# Patient Record
Sex: FEMALE | Race: BLACK OR AFRICAN AMERICAN | NOT HISPANIC OR LATINO | Employment: UNEMPLOYED | URBAN - METROPOLITAN AREA
[De-identification: names, ages, dates, MRNs, and addresses within clinical notes are randomized per-mention and may not be internally consistent; named-entity substitution may affect disease eponyms.]

---

## 2019-03-18 ENCOUNTER — HOSPITAL ENCOUNTER (EMERGENCY)
Facility: HOSPITAL | Age: 24
Discharge: HOME | End: 2019-03-18
Attending: EMERGENCY MEDICINE | Admitting: EMERGENCY MEDICINE
Payer: COMMERCIAL

## 2019-03-18 VITALS
WEIGHT: 225.53 LBS | RESPIRATION RATE: 16 BRPM | OXYGEN SATURATION: 100 % | SYSTOLIC BLOOD PRESSURE: 121 MMHG | TEMPERATURE: 98 F | DIASTOLIC BLOOD PRESSURE: 61 MMHG | HEART RATE: 90 BPM

## 2019-03-18 DIAGNOSIS — Z32.01 POSITIVE PREGNANCY TEST: ICD-10-CM

## 2019-03-18 DIAGNOSIS — B37.3 VAGINAL CANDIDIASIS: Primary | ICD-10-CM

## 2019-03-18 LAB
BILIRUB UR QL STRIP: NEGATIVE
CLARITY UR: ABNORMAL
COLOR UR: YELLOW
EXT PREG TEST URINE: POSITIVE
GLUCOSE UR STRIP-MCNC: NEGATIVE MG/DL
HGB UR QL STRIP.AUTO: NEGATIVE
KETONES UR STRIP-MCNC: NEGATIVE MG/DL
LEUKOCYTE ESTERASE UR QL STRIP: NEGATIVE
NITRITE UR QL STRIP: NEGATIVE
PH UR STRIP.AUTO: 7 [PH]
PROT UR STRIP-MCNC: NEGATIVE MG/DL
SP GR UR STRIP.AUTO: 1.01 (ref 1–1.04)
UROBILINOGEN UA: NEGATIVE MG/DL

## 2019-03-18 PROCEDURE — 81025 URINE PREGNANCY TEST: CPT | Performed by: PHYSICIAN ASSISTANT

## 2019-03-18 PROCEDURE — 81003 URINALYSIS AUTO W/O SCOPE: CPT | Performed by: PHYSICIAN ASSISTANT

## 2019-03-18 PROCEDURE — 99283 EMERGENCY DEPT VISIT LOW MDM: CPT

## 2019-03-18 RX ORDER — CLOTRIMAZOLE 1 %
1 CREAM WITH APPLICATOR VAGINAL
Qty: 45 G | Refills: 0 | Status: SHIPPED | OUTPATIENT
Start: 2019-03-18 | End: 2019-03-25

## 2019-03-18 NOTE — ED PROVIDER NOTES
History  Chief Complaint   Patient presents with    Vaginal Discharge     I have recurrentant yeast infections; this started last week  27-year-old female presenting for evaluation of vaginal discharge  Patient states that she gets yeast infections often this feels the same  She reports last time she was diagnosed and treated with yeast infection was in January 2019  Patient states that she has had symptoms occurring most recently for the past week  She reports vaginal itching along with white thick discharge  She reports the discharge is not malodorous  She denies any dysuria hematuria or vaginal bleeding  She is not concerned for STDs  No abdominal pain nausea vomiting diarrhea fevers chills or sweats  None       History reviewed  No pertinent past medical history  History reviewed  No pertinent surgical history  History reviewed  No pertinent family history  I have reviewed and agree with the history as documented  Social History     Tobacco Use    Smoking status: Never Smoker    Smokeless tobacco: Never Used   Substance Use Topics    Alcohol use: Yes    Drug use: Never        Review of Systems   All other systems reviewed and are negative  Physical Exam  Physical Exam   Constitutional: She is oriented to person, place, and time  She appears well-developed and well-nourished  No distress  HENT:   Head: Normocephalic and atraumatic  Eyes: Conjunctivae are normal    EOM grossly intact   Neck: Normal range of motion  Neck supple  No JVD present  Cardiovascular: Normal rate  Pulmonary/Chest: Effort normal    Abdominal: Soft  Genitourinary:   Genitourinary Comments: deferred   Musculoskeletal:   FROM, steady gait, cap refill brisk, strength and sensation grossly intact throughout   Neurological: She is alert and oriented to person, place, and time  Skin: Skin is warm and dry  Capillary refill takes less than 2 seconds     Psychiatric: She has a normal mood and affect  Her behavior is normal    Nursing note and vitals reviewed        Vital Signs  ED Triage Vitals [03/18/19 1443]   Temperature Pulse Respirations Blood Pressure SpO2   98 °F (36 7 °C) 90 16 121/61 100 %      Temp Source Heart Rate Source Patient Position - Orthostatic VS BP Location FiO2 (%)   Tymp Core Monitor Sitting Left arm --      Pain Score       No Pain           Vitals:    03/18/19 1443   BP: 121/61   Pulse: 90   Patient Position - Orthostatic VS: Sitting       qSOFA     Row Name 03/18/19 1443                Altered mental status GCS < 15  --        Respiratory Rate > / =71  0        Systolic BP < / =307  0        Q Sofa Score  0              Visual Acuity      ED Medications  Medications - No data to display    Diagnostic Studies  Results Reviewed     Procedure Component Value Units Date/Time    UA w Reflex to Microscopic w Reflex to Culture [[id number]]  (Abnormal) Collected:  03/18/19 1502    Lab Status:  Final result Specimen:  Urine, Clean Catch Updated:  03/18/19 1513     Color, UA Yellow     Clarity, UA Cloudy     Specific Gravity, UA 1 015     pH, UA 7 0     Leukocytes, UA Negative     Nitrite, UA Negative     Protein, UA Negative mg/dl      Glucose, UA Negative mg/dl      Ketones, UA Negative mg/dl      Bilirubin, UA Negative     Blood, UA Negative     UROBILINOGEN UA Negative mg/dL     POCT pregnancy, urine [[id number]]  (Abnormal) Resulted:  03/18/19 1506    Lab Status:  Final result Updated:  03/18/19 1507     EXT PREG TEST UR (Ref: Negative) positive                 No orders to display              Procedures  Procedures       Phone Contacts  ED Phone Contact    ED Course  ED Course as of Mar 18 1527   Mon Mar 18, 2019   1510 Patient was advised of positive pregnancy test, this was new information as she reports LMP was 2/26, will await UA and then dispo                                  MDM  Number of Diagnoses or Management Options  Positive pregnancy test:   Vaginal candidiasis: Diagnosis management comments: 21-year-old female presenting for evaluation of vaginal discharge, patient did have a positive pregnancy test here which was not known to her prior to this ED visit, patient did not have any UTI on urinalysis, advised her that Diflucan p o  Should not be taken during pregnancy especially in the 1st trimester, will send home with prescription for topical vaginal cream, advised to f/u with obgyn, f/u with pcp as needed     All labs discussed with patient, strict return to ED precautions discussed  Pt verbalizes understanding and agrees with plan  Pt is stable for discharge    Portions of the record may have been created with voice recognition software  Occasional wrong word or "sound a like" substitutions may have occurred due to the inherent limitations of voice recognition software  Read the chart carefully and recognize, using context, where substitutions have occurred  Disposition  Final diagnoses:   Vaginal candidiasis   Positive pregnancy test     Time reflects when diagnosis was documented in both MDM as applicable and the Disposition within this note     Time User Action Codes Description Comment    3/18/2019  3:18 PM Selma Greene Add [B37 3] Vaginal candidiasis     3/18/2019  3:18 PM Selma Greene Add [Z32 01] Positive pregnancy test       ED Disposition     ED Disposition Condition Date/Time Comment    Discharge Stable Mon Mar 18, 2019  3:18 PM Rebecca Vilchis discharge to home/self care              Follow-up Information     Follow up With Specialties Details Why [address] [facility] Call in 1 day  [address]  [phone]      [facility] [place]  Call in 1 day  [facility], Nya Barrientos [facility]0 [phone]          Discharge Medication List as of 3/18/2019  3:19 PM      START taking these medications    Details   clotrimazole (GYNE-LOTRIMIN) 1 % vaginal cream Insert 1 applicator into the vagina daily at bedtime for 7 days, Starting Mon 3/18/2019, Until Mon 3/25/2019, Print           No discharge procedures on file      ED Provider  Electronically Signed by           Aditya Medrano PA-C  03/18/19 1527

## 2019-04-10 ENCOUNTER — TELEPHONE (OUTPATIENT)
Dept: OBGYN CLINIC | Facility: CLINIC | Age: 24
End: 2019-04-10

## 2019-04-16 ENCOUNTER — HOSPITAL ENCOUNTER (EMERGENCY)
Facility: HOSPITAL | Age: 24
Discharge: HOME | End: 2019-04-16
Attending: EMERGENCY MEDICINE | Admitting: EMERGENCY MEDICINE
Payer: COMMERCIAL

## 2019-04-16 VITALS
WEIGHT: 209 LBS | OXYGEN SATURATION: 100 % | HEART RATE: 100 BPM | SYSTOLIC BLOOD PRESSURE: 150 MMHG | TEMPERATURE: 98.2 F | DIASTOLIC BLOOD PRESSURE: 86 MMHG | RESPIRATION RATE: 14 BRPM

## 2019-04-16 DIAGNOSIS — B96.89 BACTERIAL VAGINOSIS: Primary | ICD-10-CM

## 2019-04-16 DIAGNOSIS — B37.3 VULVOVAGINAL CANDIDIASIS: ICD-10-CM

## 2019-04-16 DIAGNOSIS — N76.0 BACTERIAL VAGINOSIS: Primary | ICD-10-CM

## 2019-04-16 PROCEDURE — 99283 EMERGENCY DEPT VISIT LOW MDM: CPT

## 2019-04-16 PROCEDURE — 87660 TRICHOMONAS VAGIN DIR PROBE: CPT | Performed by: PHYSICIAN ASSISTANT

## 2019-04-16 PROCEDURE — 99283 EMERGENCY DEPT VISIT LOW MDM: CPT | Performed by: PHYSICIAN ASSISTANT

## 2019-04-16 PROCEDURE — 87480 CANDIDA DNA DIR PROBE: CPT | Performed by: PHYSICIAN ASSISTANT

## 2019-04-16 PROCEDURE — 87510 GARDNER VAG DNA DIR PROBE: CPT | Performed by: PHYSICIAN ASSISTANT

## 2019-04-16 RX ORDER — FLUCONAZOLE 100 MG/1
200 TABLET ORAL ONCE
Status: COMPLETED | OUTPATIENT
Start: 2019-04-16 | End: 2019-04-16

## 2019-04-16 RX ORDER — METRONIDAZOLE 500 MG/1
500 TABLET ORAL EVERY 12 HOURS SCHEDULED
Qty: 14 TABLET | Refills: 0 | Status: SHIPPED | OUTPATIENT
Start: 2019-04-16 | End: 2019-04-23

## 2019-04-16 RX ADMIN — FLUCONAZOLE 200 MG: 100 TABLET ORAL at 19:30

## 2019-04-18 LAB
CANDIDA RRNA VAG QL PROBE: NEGATIVE
G VAGINALIS RRNA GENITAL QL PROBE: NEGATIVE
T VAGINALIS RRNA GENITAL QL PROBE: NEGATIVE

## 2019-06-23 ENCOUNTER — HOSPITAL ENCOUNTER (EMERGENCY)
Facility: HOSPITAL | Age: 24
Discharge: HOME | End: 2019-06-23
Attending: EMERGENCY MEDICINE | Admitting: EMERGENCY MEDICINE
Payer: COMMERCIAL

## 2019-06-23 VITALS
OXYGEN SATURATION: 100 % | HEART RATE: 83 BPM | DIASTOLIC BLOOD PRESSURE: 77 MMHG | RESPIRATION RATE: 16 BRPM | SYSTOLIC BLOOD PRESSURE: 142 MMHG | TEMPERATURE: 98.7 F | WEIGHT: 193.34 LBS

## 2019-06-23 DIAGNOSIS — B96.89 BACTERIAL VAGINOSIS: ICD-10-CM

## 2019-06-23 DIAGNOSIS — N76.0 BACTERIAL VAGINOSIS: ICD-10-CM

## 2019-06-23 DIAGNOSIS — T16.1XXA FOREIGN BODY OF RIGHT EAR, INITIAL ENCOUNTER: Primary | ICD-10-CM

## 2019-06-23 LAB
BILIRUB UR QL STRIP: NEGATIVE
CLARITY UR: CLEAR
COLOR UR: NORMAL
EXT PREG TEST URINE: NEGATIVE
EXT. CONTROL ED NAV: NORMAL
GLUCOSE UR STRIP-MCNC: NEGATIVE MG/DL
HGB UR QL STRIP.AUTO: NEGATIVE
KETONES UR STRIP-MCNC: NEGATIVE MG/DL
LEUKOCYTE ESTERASE UR QL STRIP: NEGATIVE
NITRITE UR QL STRIP: NEGATIVE
PH UR STRIP.AUTO: 8 [PH]
PROT UR STRIP-MCNC: NEGATIVE MG/DL
SP GR UR STRIP.AUTO: 1.02 (ref 1–1.04)
UROBILINOGEN UA: NEGATIVE MG/DL

## 2019-06-23 PROCEDURE — 81003 URINALYSIS AUTO W/O SCOPE: CPT | Performed by: PHYSICIAN ASSISTANT

## 2019-06-23 PROCEDURE — 81025 URINE PREGNANCY TEST: CPT | Performed by: PHYSICIAN ASSISTANT

## 2019-06-23 PROCEDURE — 99283 EMERGENCY DEPT VISIT LOW MDM: CPT

## 2019-06-23 PROCEDURE — 69200 CLEAR OUTER EAR CANAL: CPT | Performed by: PHYSICIAN ASSISTANT

## 2019-06-23 PROCEDURE — 99283 EMERGENCY DEPT VISIT LOW MDM: CPT | Performed by: PHYSICIAN ASSISTANT

## 2019-06-23 PROCEDURE — 96372 THER/PROPH/DIAG INJ SC/IM: CPT

## 2019-06-23 RX ORDER — KETOROLAC TROMETHAMINE 30 MG/ML
15 INJECTION, SOLUTION INTRAMUSCULAR; INTRAVENOUS ONCE
Status: COMPLETED | OUTPATIENT
Start: 2019-06-23 | End: 2019-06-23

## 2019-06-23 RX ORDER — METRONIDAZOLE 500 MG/1
500 TABLET ORAL EVERY 12 HOURS SCHEDULED
Qty: 20 TABLET | Refills: 0 | Status: SHIPPED | OUTPATIENT
Start: 2019-06-23 | End: 2019-07-03

## 2019-06-23 RX ADMIN — KETOROLAC TROMETHAMINE 15 MG: 30 INJECTION, SOLUTION INTRAMUSCULAR; INTRAVENOUS at 16:38

## 2019-06-23 RX ADMIN — Medication 1 APPLICATION: at 15:42

## 2019-06-23 NOTE — ED PROVIDER NOTES
History  Chief Complaint   Patient presents with    Foreign Body in [address] stuck in rt ear    Vaginal Discharge     vag discharge and odor for 3-4 days     50-year-old female presenting for evaluation of foreign body in the right ear along with vaginal discharge  Patient states she has had vaginal discharge for 3-4 days that she describes as white, thin and malodorous  She denies any dysuria hematuria vaginal bleeding  No abdominal pain nausea vomiting diarrhea back pain or fevers  In addition, patient reports getting the right tragus peers approximately 2 weeks ago  She states that she thought the piercing fell out but yesterday felt the piercing in the posterior aspect of the right tragus  She reports that somehow pulled through and already healed in the anterior aspect of the tragus  She states that she called her peer sore and sent them a picture and they advised to come to the ED for removal           None       History reviewed  No pertinent past medical history  History reviewed  No pertinent surgical history  History reviewed  No pertinent family history  I have reviewed and agree with the history as documented  Social History     Tobacco Use    Smoking status: Never Smoker    Smokeless tobacco: Never Used   Substance Use Topics    Alcohol use: Yes    Drug use: Never        Review of Systems   All other systems reviewed and are negative  Physical Exam  Physical Exam   Constitutional: She is oriented to person, place, and time  She appears well-developed and well-nourished  No distress  HENT:   Head: Normocephalic and atraumatic  Ears:    Eyes: Conjunctivae are normal    EOM grossly intact   Neck: Normal range of motion  Neck supple  No JVD present  Cardiovascular: Normal rate  Pulmonary/Chest: Effort normal    Abdominal: Soft     Genitourinary:   Genitourinary Comments: deferred   Musculoskeletal:   FROM, steady gait, cap refill brisk, strength and sensation grossly intact throughout   Neurological: She is alert and oriented to person, place, and time  Skin: Skin is warm and dry  Capillary refill takes less than 2 seconds  Psychiatric: She has a normal mood and affect  Her behavior is normal    Nursing note and vitals reviewed        Vital Signs  ED Triage Vitals [06/23/19 1525]   Temperature Pulse Respirations Blood Pressure SpO2   98 7 °F (37 1 °C) 83 16 142/77 100 %      Temp Source Heart Rate Source Patient Position - Orthostatic VS BP Location FiO2 (%)   Tympanic Monitor Sitting Left arm --      Pain Score       No Pain           Vitals:    06/23/19 1525   BP: 142/77   Pulse: 83   Patient Position - Orthostatic VS: Sitting         Visual Acuity      ED Medications  Medications   LET gel 1 application (1 application Topical Given 6/23/19 1542)   ketorolac (TORADOL) injection 15 mg (15 mg Intramuscular Given 6/23/19 1638)       Diagnostic Studies  Results Reviewed     Procedure Component Value Units Date/Time    UA w Reflex to Microscopic w Reflex to Culture [[id number]]  (Normal) Collected:  06/23/19 1542    Lab Status:  Final result Specimen:  Urine, Other Updated:  06/23/19 1549     Color, UA Straw     Clarity, UA Clear     Specific [place], UA 1 020     pH, UA 8 0     Leukocytes, UA Negative     Nitrite, UA Negative     Protein, UA Negative mg/dl      Glucose, UA Negative mg/dl      Ketones, UA Negative mg/dl      Bilirubin, UA Negative     Blood, UA Negative     UROBILINOGEN UA Negative mg/dL     POCT pregnancy, urine [[id number]]  (Normal) Resulted:  06/23/19 1543    Lab Status:  Final result Updated:  06/23/19 1543     EXT PREG TEST UR (Ref: Negative) negative     Control valid                 No orders to display              Procedures  Foreign Body - Orifice  Date/Time: 6/23/2019 4:42 PM  Performed by: Yair Bass PA-C  Authorized by: Yair Bass PA-C     Patient location:  ED  Other Assisting Provider: Yes (comment)    Consent:     Consent obtained:  Verbal    Consent given by:  Patient    Risks discussed:  Bleeding, damage to surrounding structures, incomplete removal, pain, need for surgical removal, infection, TM perforation and worsening of condition    Alternatives discussed:  No treatment  Universal protocol:     Patient identity confirmed:  Verbally with patient  Location:     Location:  Ear    Ear location:  R ear  Anesthesia (see MAR for exact dosages): Topical anesthetic:  Lidocaine gel  Procedure details:     Localization method:  Direct visualization    Removal mechanism: Forceps and hemostat    Procedure complexity:  Simple    Foreign bodies recovered:  1    Description:  L shaped tragus earring    Intact foreign body removal: yes    Post-procedure details:     Confirmation:  No additional foreign bodies on visualization    Patient tolerance of procedure: Tolerated with difficulty           ED Course                               MDM  Number of Diagnoses or Management Options  Bacterial vaginosis:   Foreign body of right ear, initial encounter:   Diagnosis management comments: 20-year-old female presenting for evaluation of vaginal discharge and foreign body in the right ear, foreign body was successfully removed from the right ear with difficulty, foreign body was a L-shaped steady earring, patient did not have any UTI on urinalysis, negative pregnancy, HPI review of systems consistent with a bacterial vaginosis, will send her home Flagyl and advised to follow up with OBGYN and PCP as an outpatient    All labs discussed with patient, strict return to ED precautions discussed  Pt verbalizes understanding and agrees with plan  Pt is stable for discharge    Portions of the record may have been created with voice recognition software  Occasional wrong word or "sound a like" substitutions may have occurred due to the inherent limitations of voice recognition software   Read the chart carefully and recognize, using context, where substitutions have occurred  Disposition  Final diagnoses:   Foreign body of right ear, initial encounter   Bacterial vaginosis     Time reflects when diagnosis was documented in both MDM as applicable and the Disposition within this note     Time User Action Codes Description Comment    6/23/2019  4:34 PM Nikolai Jurado  1XXA] Foreign body of right ear, initial encounter     6/23/2019  4:34 PM Ciaran Sergio Add [N76 0,  B96 89] Bacterial vaginosis       ED Disposition     ED Disposition Condition Date/Time Comment    Discharge Stable Norris Jun 23, 2019  4:34 PM Rebecca Vilchis discharge to home/self care  Follow-up Information     Follow up With Specialties Details Why [address] [facility] Call in 1 day  [address]  [phone]            Discharge Medication List as of 6/23/2019  4:34 PM      START taking these medications    Details   metroNIDAZOLE (FLAGYL) 500 mg tablet Take 1 tablet (500 mg total) by mouth every 12 (twelve) hours for 10 days, Starting Sun 6/23/2019, Until Wed 7/3/2019, Print           No discharge procedures on file      ED Provider  Electronically Signed by           Vickie Lombardi PA-C  06/23/19 7524

## 2024-01-02 ENCOUNTER — HOSPITAL ENCOUNTER (EMERGENCY)
Facility: HOSPITAL | Age: 29
Discharge: HOME | End: 2024-01-02
Attending: EMERGENCY MEDICINE
Payer: COMMERCIAL

## 2024-01-02 VITALS
WEIGHT: 226.9 LBS | RESPIRATION RATE: 18 BRPM | SYSTOLIC BLOOD PRESSURE: 144 MMHG | DIASTOLIC BLOOD PRESSURE: 79 MMHG | TEMPERATURE: 98.4 F | HEART RATE: 85 BPM | OXYGEN SATURATION: 98 %

## 2024-01-02 DIAGNOSIS — N89.8 VAGINAL DISCHARGE: Primary | ICD-10-CM

## 2024-01-02 PROCEDURE — 99284 EMERGENCY DEPT VISIT MOD MDM: CPT | Performed by: PHYSICIAN ASSISTANT

## 2024-01-02 PROCEDURE — 81514 NFCT DS BV&VAGINITIS DNA ALG: CPT | Performed by: PHYSICIAN ASSISTANT

## 2024-01-02 PROCEDURE — 99283 EMERGENCY DEPT VISIT LOW MDM: CPT

## 2024-01-02 RX ORDER — FLUCONAZOLE 200 MG/1
200 TABLET ORAL ONCE
Qty: 1 TABLET | Refills: 0 | Status: SHIPPED | OUTPATIENT
Start: 2024-01-05 | End: 2024-01-05

## 2024-01-02 RX ORDER — FLUCONAZOLE 100 MG/1
200 TABLET ORAL ONCE
Status: COMPLETED | OUTPATIENT
Start: 2024-01-02 | End: 2024-01-02

## 2024-01-02 RX ADMIN — FLUCONAZOLE 200 MG: 100 TABLET ORAL at 21:49

## 2024-01-03 LAB
C GLABRATA DNA VAG QL NAA+PROBE: NEGATIVE
C KRUSEI DNA VAG QL NAA+PROBE: NEGATIVE
CANDIDA SP 6 PNL VAG NAA+PROBE: POSITIVE
T VAGINALIS DNA VAG QL NAA+PROBE: NEGATIVE
VAGINOSIS/ITIS DNA PNL VAG PROBE+SIG AMP: NEGATIVE

## 2024-01-03 NOTE — ED PROVIDER NOTES
History  Chief Complaint   Patient presents with    Vaginal Discharge     I think I have a yeast infection - itchy and dry as of today.      28-year-old female without significant past medical history presents complaining of symptoms of a yeast infection.  Patient states that she has itchiness and dryness of the vagina that feels similar to her prior yeast infection.  Patient states she is also had BV in the past and is unsure which she could be today however states it feels more like yeast than anything else.  Denies concern for STD.  Denies dysuria.  Denies any other complaints.      History provided by:  Patient   used: No        None       History reviewed. No pertinent past medical history.    History reviewed. No pertinent surgical history.    History reviewed. No pertinent family history.  I have reviewed and agree with the history as documented.    E-Cigarette/Vaping    E-Cigarette Use Current Some Day User     Comments hooka      E-Cigarette/Vaping Substances     Social History     Tobacco Use    Smoking status: Never    Smokeless tobacco: Never   Vaping Use    Vaping status: Some Days   Substance Use Topics    Alcohol use: Yes     Comment: social    Drug use: Never       Review of Systems   Constitutional: Negative.  Negative for chills and fatigue.   HENT:  Negative for ear pain and sore throat.    Eyes:  Negative for photophobia and redness.   Respiratory:  Negative for apnea, cough and shortness of breath.    Cardiovascular:  Negative for chest pain.   Gastrointestinal:  Negative for abdominal pain, nausea and vomiting.   Genitourinary:  Positive for vaginal discharge. Negative for dysuria.        Vaginal itching   Musculoskeletal:  Negative for arthralgias, neck pain and neck stiffness.   Skin:  Negative for rash.   Neurological:  Negative for dizziness, tremors, syncope and weakness.   Psychiatric/Behavioral:  Negative for suicidal ideas.        Physical Exam  Physical  Exam  Constitutional:       General: She is not in acute distress.     Appearance: She is well-developed. She is not diaphoretic.   Eyes:      Pupils: Pupils are equal, round, and reactive to light.   Cardiovascular:      Rate and Rhythm: Normal rate and regular rhythm.   Pulmonary:      Effort: Pulmonary effort is normal. No respiratory distress.      Breath sounds: Normal breath sounds.   Abdominal:      General: Bowel sounds are normal. There is no distension.      Palpations: Abdomen is soft.   Genitourinary:     Comments: Normal external genitalia with mild white vaginal discharge.  Musculoskeletal:         General: Normal range of motion.      Cervical back: Normal range of motion and neck supple.   Skin:     General: Skin is warm and dry.   Neurological:      Mental Status: She is alert and oriented to person, place, and time.         Vital Signs  ED Triage Vitals [01/02/24 2111]   Temperature Pulse Respirations Blood Pressure SpO2   98.4 °F (36.9 °C) 85 18 144/79 98 %      Temp Source Heart Rate Source Patient Position - Orthostatic VS BP Location FiO2 (%)   Tympanic Monitor Sitting Left arm --      Pain Score       --           Vitals:    01/02/24 2111   BP: 144/79   Pulse: 85   Patient Position - Orthostatic VS: Sitting         Visual Acuity      ED Medications  Medications   fluconazole (DIFLUCAN) tablet 200 mg (has no administration in time range)       Diagnostic Studies  Results Reviewed       Procedure Component Value Units Date/Time    Molecular Vaginal Panel [[id number]] Collected: 01/02/24 2143    Lab Status: In process Specimen: Genital from Vaginal Updated: 01/02/24 2146                   No orders to display              Procedures  Procedures         ED Course                                             Medical Decision Making  Patient presenting complaining of vaginal dryness and itchiness.  Complaining of discharge.  History of prior yeast infection as well as BV.  Patient is unsure which when  she could have today.  Patient states it feels more like yeast.  On exam patient did have some white vaginal discharge.  No obvious focal abnormality.  Plan to treat for yeast infection at this time while pending results from molecular vaginal panel.  Patient was educated on supportive care.  Given return precautions.  Given GYN follow-up.  Discharged home.    Amount and/or Complexity of Data Reviewed  Labs: ordered.    Risk  Prescription drug management.             Disposition  Final diagnoses:   Vaginal discharge     Time reflects when diagnosis was documented in both MDM as applicable and the Disposition within this note       Time User Action Codes Description Comment    1/2/2024  9:47 PM Zonia Cheng Add [N89.8] Vaginal discharge           ED Disposition       ED Disposition   Discharge    Condition   Stable    Date/Time   Tue Jan 2, 2024  9:47 PM    Comment   Rebecca Vilchis discharge to home/self care.                   Follow-up Information       Follow up With Specialties Details Why Contact Info Additional Information    [facility] Emergency Medicine Go to  If symptoms worsen [address] [facility] [address] [phone] [facility]    [facility] Obstetrics and Gynecology Schedule an appointment as soon as possible for a visit  If symptoms worsen [address] [place] [address] [phone] [facility], [address] [place] [address], [address], [place], [place], [address]   [phone]            Patient's Medications   Discharge Prescriptions    FLUCONAZOLE (DIFLUCAN) 200 MG TABLET    Take 1 tablet (200 mg total) by mouth once for 1 dose Do not start before January 5, 2024.       Start Date: 1/5/2024  End Date: 1/5/2024       Order Dose: 200 mg       Quantity: 1 tablet    Refills: 0       No discharge procedures  on file.    PDMP Review       None            ED Provider  Electronically Signed by             Zonia Cheng PA-C  01/02/24 5437

## 2024-01-03 NOTE — DISCHARGE INSTRUCTIONS
You received Diflucan today.  If results are positive for any other bacterial infection she will be notified medications will be sent to your pharmacy.  Follow-up with GYN.

## 2024-08-12 ENCOUNTER — APPOINTMENT (EMERGENCY)
Dept: RADIOLOGY | Facility: HOSPITAL | Age: 29
End: 2024-08-12

## 2024-08-12 ENCOUNTER — HOSPITAL ENCOUNTER (EMERGENCY)
Facility: HOSPITAL | Age: 29
Discharge: HOME | End: 2024-08-12
Attending: EMERGENCY MEDICINE

## 2024-08-12 VITALS
RESPIRATION RATE: 18 BRPM | TEMPERATURE: 98.4 F | WEIGHT: 185.41 LBS | SYSTOLIC BLOOD PRESSURE: 180 MMHG | HEART RATE: 81 BPM | OXYGEN SATURATION: 99 % | DIASTOLIC BLOOD PRESSURE: 68 MMHG

## 2024-08-12 DIAGNOSIS — S99.922A INJURY OF LEFT FOOT, INITIAL ENCOUNTER: Primary | ICD-10-CM

## 2024-08-12 LAB
EXT PREGNANCY TEST URINE: NEGATIVE
EXT. CONTROL: NORMAL

## 2024-08-12 PROCEDURE — 96372 THER/PROPH/DIAG INJ SC/IM: CPT

## 2024-08-12 PROCEDURE — 90471 IMMUNIZATION ADMIN: CPT

## 2024-08-12 PROCEDURE — 99284 EMERGENCY DEPT VISIT MOD MDM: CPT

## 2024-08-12 PROCEDURE — 99283 EMERGENCY DEPT VISIT LOW MDM: CPT

## 2024-08-12 PROCEDURE — 90715 TDAP VACCINE 7 YRS/> IM: CPT

## 2024-08-12 PROCEDURE — 73630 X-RAY EXAM OF FOOT: CPT

## 2024-08-12 PROCEDURE — 81025 URINE PREGNANCY TEST: CPT

## 2024-08-12 RX ORDER — NAPROXEN 500 MG/1
500 TABLET ORAL 2 TIMES DAILY WITH MEALS
Qty: 10 TABLET | Refills: 0 | Status: SHIPPED | OUTPATIENT
Start: 2024-08-12 | End: 2025-08-12

## 2024-08-12 RX ORDER — GINSENG 100 MG
1 CAPSULE ORAL ONCE
Status: COMPLETED | OUTPATIENT
Start: 2024-08-12 | End: 2024-08-12

## 2024-08-12 RX ORDER — KETOROLAC TROMETHAMINE 30 MG/ML
15 INJECTION, SOLUTION INTRAMUSCULAR; INTRAVENOUS ONCE
Status: COMPLETED | OUTPATIENT
Start: 2024-08-12 | End: 2024-08-12

## 2024-08-12 RX ADMIN — TETANUS TOXOID, REDUCED DIPHTHERIA TOXOID AND ACELLULAR PERTUSSIS VACCINE, ADSORBED 0.5 ML: 5; 2.5; 8; 8; 2.5 SUSPENSION INTRAMUSCULAR at 21:06

## 2024-08-12 RX ADMIN — KETOROLAC TROMETHAMINE 15 MG: 30 INJECTION, SOLUTION INTRAMUSCULAR; INTRAVENOUS at 21:06

## 2024-08-12 RX ADMIN — BACITRACIN 1 SMALL APPLICATION: 500 OINTMENT TOPICAL at 21:07
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
## 2024-08-13 NOTE — DISCHARGE INSTRUCTIONS
Rest, ice (for 48 hours), elevate. Weightbearing as tolerated. Follow up with PCP, Podiatry. Return to ED for new or worsening symptoms as discussed.